# Patient Record
Sex: MALE | Race: WHITE | NOT HISPANIC OR LATINO | Employment: UNEMPLOYED | URBAN - METROPOLITAN AREA
[De-identification: names, ages, dates, MRNs, and addresses within clinical notes are randomized per-mention and may not be internally consistent; named-entity substitution may affect disease eponyms.]

---

## 2021-03-05 ENCOUNTER — OFFICE VISIT (OUTPATIENT)
Dept: OTOLARYNGOLOGY | Age: 36
End: 2021-03-05

## 2021-03-05 VITALS — WEIGHT: 170 LBS | HEIGHT: 68 IN | BODY MASS INDEX: 25.76 KG/M2

## 2021-03-05 DIAGNOSIS — J36 ABSCESS, PERITONSILLAR: Primary | ICD-10-CM

## 2021-03-05 PROCEDURE — 96372 THER/PROPH/DIAG INJ SC/IM: CPT | Performed by: PHYSICIAN ASSISTANT

## 2021-03-05 PROCEDURE — 42700 I&D ABSCESS PERITONSILLAR: CPT | Performed by: PHYSICIAN ASSISTANT

## 2021-03-05 PROCEDURE — 99204 OFFICE O/P NEW MOD 45 MIN: CPT | Performed by: PHYSICIAN ASSISTANT

## 2021-03-05 RX ORDER — CLINDAMYCIN HYDROCHLORIDE 300 MG/1
300 CAPSULE ORAL 4 TIMES DAILY
Qty: 40 CAPSULE | Refills: 0 | Status: SHIPPED | OUTPATIENT
Start: 2021-03-05 | End: 2021-03-15

## 2021-03-08 ENCOUNTER — TELEPHONE (OUTPATIENT)
Dept: OTOLARYNGOLOGY | Age: 36
End: 2021-03-08

## 2021-03-08 RX ORDER — AMOXICILLIN 500 MG/1
TABLET, FILM COATED ORAL
COMMUNITY
Start: 2021-03-01

## 2025-05-22 ENCOUNTER — EMERGENCY (EMERGENCY)
Facility: HOSPITAL | Age: 40
LOS: 1 days | End: 2025-05-22
Attending: STUDENT IN AN ORGANIZED HEALTH CARE EDUCATION/TRAINING PROGRAM | Admitting: STUDENT IN AN ORGANIZED HEALTH CARE EDUCATION/TRAINING PROGRAM
Payer: SELF-PAY

## 2025-05-22 VITALS
WEIGHT: 164.91 LBS | SYSTOLIC BLOOD PRESSURE: 127 MMHG | DIASTOLIC BLOOD PRESSURE: 86 MMHG | RESPIRATION RATE: 18 BRPM | TEMPERATURE: 98 F | HEART RATE: 89 BPM | OXYGEN SATURATION: 98 % | HEIGHT: 68 IN

## 2025-05-22 PROCEDURE — 99284 EMERGENCY DEPT VISIT MOD MDM: CPT

## 2025-05-22 PROCEDURE — 99283 EMERGENCY DEPT VISIT LOW MDM: CPT

## 2025-05-22 RX ORDER — METHOCARBAMOL 500 MG/1
750 TABLET, FILM COATED ORAL ONCE
Refills: 0 | Status: COMPLETED | OUTPATIENT
Start: 2025-05-22 | End: 2025-05-22

## 2025-05-22 RX ORDER — CYCLOBENZAPRINE HYDROCHLORIDE 15 MG/1
1 CAPSULE, EXTENDED RELEASE ORAL
Qty: 20 | Refills: 0
Start: 2025-05-22 | End: 2025-05-31

## 2025-05-22 RX ORDER — IBUPROFEN 200 MG
600 TABLET ORAL ONCE
Refills: 0 | Status: COMPLETED | OUTPATIENT
Start: 2025-05-22 | End: 2025-05-22

## 2025-05-22 RX ADMIN — Medication 600 MILLIGRAM(S): at 13:15

## 2025-05-22 RX ADMIN — METHOCARBAMOL 750 MILLIGRAM(S): 500 TABLET, FILM COATED ORAL at 13:15

## 2025-05-22 NOTE — ED ADULT TRIAGE NOTE - CHIEF COMPLAINT QUOTE
c/o L calf pain x 1 wk after hiking [facility]. flew in from [place] on 5/13. denies cp, sob, hx of dvt.  pt in nad, a&ox4, ambulatory into triage.

## 2025-05-22 NOTE — ED PROVIDER NOTE - PATIENT PORTAL LINK FT
You can access the FollowMyHealth Patient Portal offered by [facility] by registering at the following website: [email or web address]. By joining [facility]’s FollowMyHealth portal, you will also be able to view your health information using other applications (apps) compatible with our system.

## 2025-05-22 NOTE — ED PROVIDER NOTE - CLINICAL SUMMARY MEDICAL DECISION MAKING FREE TEXT BOX
patient present emerged apartment for left calf pain.  Patient developed pain after strenuous activity.  Patient without any chest pain shortness of breath.  Patient well-appearing in no acute distress mild tenderness along the muscle belly no diffuse calf swelling redness normal vascular exam low suspicion for DVT given lack of swelling lack of significant risk factors and much more likely to be strain given history.  Low suspicion for fracture given lack of direct trauma.  Plans for pain control and reassessment.  Dissipate discharge home with instructions to follow-up with sports medicine should pain persist.
Yes

## 2025-05-22 NOTE — ED PROVIDER NOTE - IV ALTEPLASE DOOR HIDDEN
Patient notified Rx at [facility] for .  Verbalized understanding and agreement show

## 2025-05-22 NOTE — ED PROVIDER NOTE - MUSCULOSKELETAL, MLM
Spine appears normal, range of motion is not limited,  Mild tenderness at the insertion site of the lateral gastrocnemius muscle to the knee joint no calf swelling normal pulses warm extremity

## 2025-05-22 NOTE — ED PROVIDER NOTE - NSFOLLOWUPINSTRUCTIONS_ED_ALL_ED_FT
Please make sure to take 400 mg of Motrin every 6-8 hours for pain and use Voltaren gel to the affected area 2 times daily.  Please  the medication from the pharmacy  within the hospital.  Please refrain from overexerting yourself as much as possible.  Come back to Emergency Department if you have any emergent concerns.

## 2025-05-22 NOTE — ED PROVIDER NOTE - OBJECTIVE STATEMENT
Patient is a 39-year-old male present emerged part with left leg pain.  Patient states he was hiking strenuously last week.  Patient states he subsequently developed pain behind his left calf near his knee.  Patient notes that hurts with movement pain-free at rest.  Denies any swelling of his leg numbness tingling.  Denies any pain shortness of breath.  Denies any rash.  Denies any medical issues or medications.

## 2025-05-22 NOTE — ED ADULT NURSE NOTE - CHIEF COMPLAINT QUOTE
c/o L calf pain x 1 wk after hiking [facility]. flew in from [place] on 5/13. denies cp, sob, hx of dvt.  pt in nad, a&ox4, ambulatory into triage.

## 2025-05-22 NOTE — ED ADULT NURSE NOTE - OBJECTIVE STATEMENT
PT c/o L calf pain while on multi day hike, starting on 5/15, reports resolved but then had resurgence 5/19 and has continued, currently 5/10, denies localized swelling, redness, or heat, ambulatory, denies other sx. 36.5

## 2025-05-26 DIAGNOSIS — S86.812A STRAIN OF OTHER MUSCLE(S) AND TENDON(S) AT LOWER LEG LEVEL, LEFT LEG, INITIAL ENCOUNTER: ICD-10-CM
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
## 2025-05-26 DIAGNOSIS — M79.605 PAIN IN LEFT LEG: ICD-10-CM
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
